# Patient Record
Sex: MALE | Race: WHITE | NOT HISPANIC OR LATINO | Employment: FULL TIME | URBAN - NONMETROPOLITAN AREA
[De-identification: names, ages, dates, MRNs, and addresses within clinical notes are randomized per-mention and may not be internally consistent; named-entity substitution may affect disease eponyms.]

---

## 2021-03-15 ENCOUNTER — OFFICE VISIT (OUTPATIENT)
Dept: URGENT CARE | Facility: PHYSICIAN GROUP | Age: 41
End: 2021-03-15

## 2021-03-15 VITALS
TEMPERATURE: 97.9 F | OXYGEN SATURATION: 94 % | WEIGHT: 189 LBS | SYSTOLIC BLOOD PRESSURE: 146 MMHG | HEART RATE: 109 BPM | DIASTOLIC BLOOD PRESSURE: 106 MMHG | HEIGHT: 71 IN | RESPIRATION RATE: 16 BRPM | BODY MASS INDEX: 26.46 KG/M2

## 2021-03-15 DIAGNOSIS — F10.920 ALCOHOLIC INTOXICATION WITHOUT COMPLICATION (HCC): ICD-10-CM

## 2021-03-15 DIAGNOSIS — R10.9 ABDOMINAL PAIN, UNSPECIFIED ABDOMINAL LOCATION: ICD-10-CM

## 2021-03-15 DIAGNOSIS — R41.0 DELIRIUM: ICD-10-CM

## 2021-03-15 PROCEDURE — 99203 OFFICE O/P NEW LOW 30 MIN: CPT | Performed by: PHYSICIAN ASSISTANT

## 2021-03-15 ASSESSMENT — ENCOUNTER SYMPTOMS
SHORTNESS OF BREATH: 0
DIZZINESS: 0
FEVER: 0
DIARRHEA: 0
CONSTIPATION: 0
ABDOMINAL PAIN: 1
NAUSEA: 0
VOMITING: 0
CHILLS: 0
MUSCULOSKELETAL NEGATIVE: 1

## 2021-03-15 NOTE — PROGRESS NOTES
"Subjective:      Jere Aguero is a 40 y.o. male who presents with Other (States \"his intestines hurts\" )            HPI  Patient presents to urgent care reporting abdominal pain starting about 1 week ago. He is obviously intoxicated at today's visit and speaks multiple times about [place] giving him vertigo and confusion. He reports he is from [place] but he is in town to build \"the switch\". Patient first admits to drinking\"a lot\" of alcohol but then denies it when asked for more details. Patient denies any known medical problems and doesn't take any regular medications. No history of abdominal surgeries. He denies fevers, nausea, vomiting, or diarrhea.       Review of Systems   Constitutional: Negative for chills and fever.   HENT: Negative for congestion.    Respiratory: Negative for shortness of breath.    Cardiovascular: Negative for chest pain.   Gastrointestinal: Positive for abdominal pain. Negative for constipation, diarrhea, nausea and vomiting.   Genitourinary: Negative.    Musculoskeletal: Negative.    Skin: Negative for rash.   Neurological: Negative for dizziness.        Objective:     /106   Pulse (!) 109   Temp 36.6 °C (97.9 °F) (Temporal)   Resp 16   Ht 1.803 m (5' 11\")   Wt 85.7 kg (189 lb)   SpO2 94%   BMI 26.36 kg/m²      Physical Exam  Vitals and nursing note reviewed.   Constitutional:       General: He is not in acute distress.     Appearance: Normal appearance. He is well-developed. He is not ill-appearing.      Comments: Intoxicated    HENT:      Head: Normocephalic and atraumatic.      Right Ear: External ear normal.      Left Ear: External ear normal.      Nose: Nose normal.   Eyes:      Conjunctiva/sclera: Conjunctivae normal.   Cardiovascular:      Rate and Rhythm: Regular rhythm. Tachycardia present.      Heart sounds: Normal heart sounds. No murmur.   Pulmonary:      Effort: Pulmonary effort is normal.      Breath sounds: Normal breath sounds. No wheezing or rales. "   Abdominal:      General: Abdomen is flat. Bowel sounds are normal. There is no distension.      Tenderness: There is no abdominal tenderness. There is no right CVA tenderness, left CVA tenderness or guarding.   Musculoskeletal:         General: Normal range of motion.      Cervical back: Normal range of motion.   Skin:     General: Skin is warm and dry.   Neurological:      Mental Status: He is alert and oriented to person, place, and time.   Psychiatric:         Mood and Affect: Affect is tearful.         Speech: Speech is slurred.         Behavior: Behavior is cooperative.          PMH:  has no past medical history on file.  MEDS: No current outpatient medications on file.  ALLERGIES: No Known Allergies  SURGHX: History reviewed. No pertinent surgical history.  SOCHX:    FH: family history is not on file.       Assessment/Plan:        1. Alcoholic intoxication without complication (HCC)      2. Abdominal pain, unspecified abdominal location      3. Delirium    Recommend patient go directly to the ED for ED detoxification and suspected psychotic episode. Patient is agreeable to this plan and agreed to transfer to ED via EMS. He left urgent care in stable condition with EMS and will be transferred to [facility] ED.